# Patient Record
Sex: MALE | URBAN - METROPOLITAN AREA
[De-identification: names, ages, dates, MRNs, and addresses within clinical notes are randomized per-mention and may not be internally consistent; named-entity substitution may affect disease eponyms.]

---

## 2024-11-19 ENCOUNTER — OFFICE VISIT (OUTPATIENT)
Age: 39
End: 2024-11-19

## 2024-11-19 VITALS
SYSTOLIC BLOOD PRESSURE: 157 MMHG | DIASTOLIC BLOOD PRESSURE: 97 MMHG | HEIGHT: 70 IN | OXYGEN SATURATION: 95 % | WEIGHT: 184 LBS | HEART RATE: 100 BPM | RESPIRATION RATE: 16 BRPM | TEMPERATURE: 98.4 F | BODY MASS INDEX: 26.34 KG/M2

## 2024-11-19 DIAGNOSIS — L03.116 LEFT LEG CELLULITIS: Primary | ICD-10-CM

## 2024-11-19 DIAGNOSIS — I10 HYPERTENSION, UNSPECIFIED TYPE: ICD-10-CM

## 2024-11-19 RX ORDER — CEPHALEXIN 500 MG/1
500 CAPSULE ORAL 4 TIMES DAILY
Qty: 40 CAPSULE | Refills: 0 | Status: SHIPPED | OUTPATIENT
Start: 2024-11-19 | End: 2024-11-29

## 2024-11-19 ASSESSMENT — ENCOUNTER SYMPTOMS
SORE THROAT: 0
CHEST TIGHTNESS: 0
COUGH: 0

## 2024-11-19 NOTE — PROGRESS NOTES
sounds: Normal breath sounds.   Musculoskeletal:         General: No swelling or tenderness. Normal range of motion.      Cervical back: Neck supple. No rigidity.      Right lower leg: No edema.      Left lower leg: No edema.   Lymphadenopathy:      Cervical: No cervical adenopathy.   Skin:     Findings: No rash.      Comments: 1 cm open wound on left calf,not bleeding,no drainage,has mild erythema.dressing was applied   Neurological:      General: No focal deficit present.      Mental Status: He is alert and oriented to person, place, and time.           An electronic signature was used to authenticate this note.    --MARCO LOYD MD